# Patient Record
Sex: FEMALE | Race: ASIAN | NOT HISPANIC OR LATINO
[De-identification: names, ages, dates, MRNs, and addresses within clinical notes are randomized per-mention and may not be internally consistent; named-entity substitution may affect disease eponyms.]

---

## 2018-09-11 ENCOUNTER — APPOINTMENT (OUTPATIENT)
Dept: UROLOGY | Facility: CLINIC | Age: 48
End: 2018-09-11
Payer: MEDICAID

## 2018-09-11 VITALS
HEIGHT: 61 IN | OXYGEN SATURATION: 100 % | WEIGHT: 139 LBS | HEART RATE: 57 BPM | SYSTOLIC BLOOD PRESSURE: 157 MMHG | RESPIRATION RATE: 17 BRPM | TEMPERATURE: 98.8 F | DIASTOLIC BLOOD PRESSURE: 79 MMHG | BODY MASS INDEX: 26.24 KG/M2

## 2018-09-11 PROCEDURE — 52000 CYSTOURETHROSCOPY: CPT

## 2018-09-11 PROCEDURE — 99204 OFFICE O/P NEW MOD 45 MIN: CPT | Mod: 25

## 2018-09-11 RX ORDER — CIPROFLOXACIN HYDROCHLORIDE 500 MG/1
500 TABLET, FILM COATED ORAL
Qty: 2 | Refills: 0 | Status: ACTIVE | COMMUNITY
Start: 2018-09-11

## 2018-09-11 RX ORDER — CIPROFLOXACIN HYDROCHLORIDE 500 MG/1
500 TABLET, FILM COATED ORAL
Refills: 0 | Status: COMPLETED | OUTPATIENT
Start: 2018-09-11

## 2018-09-11 RX ADMIN — CIPROFLOXACIN HYDROCHLORIDE 0 MG: 500 TABLET, FILM COATED ORAL at 00:00

## 2018-09-18 LAB
APPEARANCE: CLEAR
BACTERIA: NEGATIVE
BILIRUBIN URINE: NEGATIVE
BLOOD URINE: ABNORMAL
COLOR: YELLOW
CORE LAB FLUID CYTOLOGY: NORMAL
GLUCOSE QUALITATIVE U: NEGATIVE MG/DL
HYALINE CASTS: 0 /LPF
KETONES URINE: NEGATIVE
LEUKOCYTE ESTERASE URINE: ABNORMAL
MICROSCOPIC-UA: NORMAL
NITRITE URINE: NEGATIVE
PH URINE: 6.5
PROTEIN URINE: NEGATIVE MG/DL
RED BLOOD CELLS URINE: 2 /HPF
SPECIFIC GRAVITY URINE: 1.01
SQUAMOUS EPITHELIAL CELLS: 1 /HPF
UROBILINOGEN URINE: NEGATIVE MG/DL
WHITE BLOOD CELLS URINE: 1 /HPF

## 2019-03-11 ENCOUNTER — APPOINTMENT (OUTPATIENT)
Dept: UROLOGY | Facility: CLINIC | Age: 49
End: 2019-03-11

## 2019-05-24 ENCOUNTER — APPOINTMENT (OUTPATIENT)
Dept: UROLOGY | Facility: CLINIC | Age: 49
End: 2019-05-24

## 2021-06-24 ENCOUNTER — APPOINTMENT (OUTPATIENT)
Dept: NEUROLOGY | Facility: CLINIC | Age: 51
End: 2021-06-24
Payer: COMMERCIAL

## 2021-06-24 PROCEDURE — 95886 MUSC TEST DONE W/N TEST COMP: CPT

## 2021-06-24 PROCEDURE — 95910 NRV CNDJ TEST 7-8 STUDIES: CPT

## 2021-11-10 ENCOUNTER — INPATIENT (INPATIENT)
Facility: HOSPITAL | Age: 51
LOS: 1 days | Discharge: HOME | DRG: 552 | End: 2021-11-12
Attending: INTERNAL MEDICINE | Admitting: STUDENT IN AN ORGANIZED HEALTH CARE EDUCATION/TRAINING PROGRAM
Payer: COMMERCIAL

## 2021-11-10 VITALS
DIASTOLIC BLOOD PRESSURE: 179 MMHG | OXYGEN SATURATION: 96 % | RESPIRATION RATE: 16 BRPM | WEIGHT: 119.93 LBS | SYSTOLIC BLOOD PRESSURE: 179 MMHG | HEIGHT: 61 IN | TEMPERATURE: 98 F | HEART RATE: 82 BPM

## 2021-11-10 LAB
ALBUMIN SERPL ELPH-MCNC: 5 G/DL — SIGNIFICANT CHANGE UP (ref 3.3–5)
ALP SERPL-CCNC: 82 U/L — SIGNIFICANT CHANGE UP (ref 40–120)
ALT FLD-CCNC: 19 U/L — SIGNIFICANT CHANGE UP (ref 10–45)
ANION GAP SERPL CALC-SCNC: 16 MMOL/L — SIGNIFICANT CHANGE UP (ref 5–17)
APPEARANCE UR: CLEAR — SIGNIFICANT CHANGE UP
APTT BLD: 31.3 SEC — SIGNIFICANT CHANGE UP (ref 27.5–35.5)
AST SERPL-CCNC: 18 U/L — SIGNIFICANT CHANGE UP (ref 10–40)
BACTERIA # UR AUTO: NEGATIVE — SIGNIFICANT CHANGE UP
BASOPHILS # BLD AUTO: 0.04 K/UL — SIGNIFICANT CHANGE UP (ref 0–0.2)
BASOPHILS NFR BLD AUTO: 0.8 % — SIGNIFICANT CHANGE UP (ref 0–2)
BILIRUB SERPL-MCNC: 0.7 MG/DL — SIGNIFICANT CHANGE UP (ref 0.2–1.2)
BILIRUB UR-MCNC: NEGATIVE — SIGNIFICANT CHANGE UP
BLD GP AB SCN SERPL QL: NEGATIVE — SIGNIFICANT CHANGE UP
BUN SERPL-MCNC: 17 MG/DL — SIGNIFICANT CHANGE UP (ref 7–23)
CALCIUM SERPL-MCNC: 10.1 MG/DL — SIGNIFICANT CHANGE UP (ref 8.4–10.5)
CHLORIDE SERPL-SCNC: 104 MMOL/L — SIGNIFICANT CHANGE UP (ref 96–108)
CO2 SERPL-SCNC: 23 MMOL/L — SIGNIFICANT CHANGE UP (ref 22–31)
COLOR SPEC: SIGNIFICANT CHANGE UP
CREAT SERPL-MCNC: 0.49 MG/DL — LOW (ref 0.5–1.3)
DIFF PNL FLD: ABNORMAL
EOSINOPHIL # BLD AUTO: 0.04 K/UL — SIGNIFICANT CHANGE UP (ref 0–0.5)
EOSINOPHIL NFR BLD AUTO: 0.8 % — SIGNIFICANT CHANGE UP (ref 0–6)
EPI CELLS # UR: 0 /HPF — SIGNIFICANT CHANGE UP
GLUCOSE SERPL-MCNC: 105 MG/DL — HIGH (ref 70–99)
GLUCOSE UR QL: NEGATIVE — SIGNIFICANT CHANGE UP
HCT VFR BLD CALC: 33.9 % — LOW (ref 34.5–45)
HGB BLD-MCNC: 12.3 G/DL — SIGNIFICANT CHANGE UP (ref 11.5–15.5)
HYALINE CASTS # UR AUTO: 0 /LPF — SIGNIFICANT CHANGE UP (ref 0–2)
IMM GRANULOCYTES NFR BLD AUTO: 0.4 % — SIGNIFICANT CHANGE UP (ref 0–1.5)
INR BLD: 1.04 RATIO — SIGNIFICANT CHANGE UP (ref 0.88–1.16)
KETONES UR-MCNC: NEGATIVE — SIGNIFICANT CHANGE UP
LEUKOCYTE ESTERASE UR-ACNC: NEGATIVE — SIGNIFICANT CHANGE UP
LYMPHOCYTES # BLD AUTO: 1.46 K/UL — SIGNIFICANT CHANGE UP (ref 1–3.3)
LYMPHOCYTES # BLD AUTO: 28.6 % — SIGNIFICANT CHANGE UP (ref 13–44)
MCHC RBC-ENTMCNC: 33 PG — SIGNIFICANT CHANGE UP (ref 27–34)
MCHC RBC-ENTMCNC: 36.3 GM/DL — HIGH (ref 32–36)
MCV RBC AUTO: 90.9 FL — SIGNIFICANT CHANGE UP (ref 80–100)
MONOCYTES # BLD AUTO: 0.34 K/UL — SIGNIFICANT CHANGE UP (ref 0–0.9)
MONOCYTES NFR BLD AUTO: 6.7 % — SIGNIFICANT CHANGE UP (ref 2–14)
NEUTROPHILS # BLD AUTO: 3.21 K/UL — SIGNIFICANT CHANGE UP (ref 1.8–7.4)
NEUTROPHILS NFR BLD AUTO: 62.7 % — SIGNIFICANT CHANGE UP (ref 43–77)
NITRITE UR-MCNC: NEGATIVE — SIGNIFICANT CHANGE UP
NRBC # BLD: 0 /100 WBCS — SIGNIFICANT CHANGE UP (ref 0–0)
PH UR: 7.5 — SIGNIFICANT CHANGE UP (ref 5–8)
PLATELET # BLD AUTO: 177 K/UL — SIGNIFICANT CHANGE UP (ref 150–400)
POTASSIUM SERPL-MCNC: 3.5 MMOL/L — SIGNIFICANT CHANGE UP (ref 3.5–5.3)
POTASSIUM SERPL-SCNC: 3.5 MMOL/L — SIGNIFICANT CHANGE UP (ref 3.5–5.3)
PROT SERPL-MCNC: 7.3 G/DL — SIGNIFICANT CHANGE UP (ref 6–8.3)
PROT UR-MCNC: ABNORMAL
PROTHROM AB SERPL-ACNC: 12.5 SEC — SIGNIFICANT CHANGE UP (ref 10.6–13.6)
RBC # BLD: 3.73 M/UL — LOW (ref 3.8–5.2)
RBC # FLD: 11.4 % — SIGNIFICANT CHANGE UP (ref 10.3–14.5)
RBC CASTS # UR COMP ASSIST: 17 /HPF — HIGH (ref 0–4)
RH IG SCN BLD-IMP: POSITIVE — SIGNIFICANT CHANGE UP
SARS-COV-2 RNA SPEC QL NAA+PROBE: SIGNIFICANT CHANGE UP
SODIUM SERPL-SCNC: 143 MMOL/L — SIGNIFICANT CHANGE UP (ref 135–145)
SP GR SPEC: 1.02 — SIGNIFICANT CHANGE UP (ref 1.01–1.02)
UROBILINOGEN FLD QL: NEGATIVE — SIGNIFICANT CHANGE UP
WBC # BLD: 5.11 K/UL — SIGNIFICANT CHANGE UP (ref 3.8–10.5)
WBC # FLD AUTO: 5.11 K/UL — SIGNIFICANT CHANGE UP (ref 3.8–10.5)
WBC UR QL: 1 /HPF — SIGNIFICANT CHANGE UP (ref 0–5)

## 2021-11-10 PROCEDURE — 72146 MRI CHEST SPINE W/O DYE: CPT | Mod: 26,MG

## 2021-11-10 PROCEDURE — 99285 EMERGENCY DEPT VISIT HI MDM: CPT

## 2021-11-10 PROCEDURE — 72148 MRI LUMBAR SPINE W/O DYE: CPT | Mod: 26,MG

## 2021-11-10 PROCEDURE — G1004: CPT

## 2021-11-10 RX ORDER — DEXAMETHASONE 0.5 MG/5ML
6 ELIXIR ORAL ONCE
Refills: 0 | Status: COMPLETED | OUTPATIENT
Start: 2021-11-10 | End: 2021-11-10

## 2021-11-10 RX ORDER — MORPHINE SULFATE 50 MG/1
2 CAPSULE, EXTENDED RELEASE ORAL ONCE
Refills: 0 | Status: DISCONTINUED | OUTPATIENT
Start: 2021-11-10 | End: 2021-11-10

## 2021-11-10 RX ORDER — ACETAMINOPHEN 500 MG
975 TABLET ORAL ONCE
Refills: 0 | Status: COMPLETED | OUTPATIENT
Start: 2021-11-10 | End: 2021-11-11

## 2021-11-10 RX ORDER — KETOROLAC TROMETHAMINE 30 MG/ML
15 SYRINGE (ML) INJECTION ONCE
Refills: 0 | Status: DISCONTINUED | OUTPATIENT
Start: 2021-11-10 | End: 2021-11-10

## 2021-11-10 RX ORDER — ACETAMINOPHEN 500 MG
1000 TABLET ORAL ONCE
Refills: 0 | Status: COMPLETED | OUTPATIENT
Start: 2021-11-10 | End: 2021-11-10

## 2021-11-10 RX ORDER — DIAZEPAM 5 MG
5 TABLET ORAL ONCE
Refills: 0 | Status: DISCONTINUED | OUTPATIENT
Start: 2021-11-10 | End: 2021-11-10

## 2021-11-10 RX ORDER — DEXAMETHASONE 0.5 MG/5ML
6 ELIXIR ORAL ONCE
Refills: 0 | Status: DISCONTINUED | OUTPATIENT
Start: 2021-11-10 | End: 2021-11-10

## 2021-11-10 RX ADMIN — Medication 15 MILLIGRAM(S): at 11:19

## 2021-11-10 RX ADMIN — Medication 15 MILLIGRAM(S): at 21:56

## 2021-11-10 RX ADMIN — Medication 5 MILLIGRAM(S): at 18:05

## 2021-11-10 RX ADMIN — Medication 6 MILLIGRAM(S): at 21:56

## 2021-11-10 RX ADMIN — MORPHINE SULFATE 2 MILLIGRAM(S): 50 CAPSULE, EXTENDED RELEASE ORAL at 23:02

## 2021-11-10 RX ADMIN — MORPHINE SULFATE 2 MILLIGRAM(S): 50 CAPSULE, EXTENDED RELEASE ORAL at 11:28

## 2021-11-10 RX ADMIN — Medication 400 MILLIGRAM(S): at 11:23

## 2021-11-10 NOTE — ED PROVIDER NOTE - OBJECTIVE STATEMENT
51 y F 2 wk L back pain rad to LLE, already seen by outpt spine MD who ordered an MRI.  BIBEMS b/c last night pain intensified and noted in the last 2 days having diminisehd sensation to L foot.  No motor weakness, saddle anesthesia, changes to bowel/bladder function/fc.  No trauma.  Put on unspecified pain medications by outpt spine.  Did not take pain Rx today.  Minimally able to ambulate.     PMH -- HTN, HLD  PSH -- c sections  NKDA  bisoprolol/hct, telmisartan, synthroid, vascepa, niacin

## 2021-11-10 NOTE — CONSULT NOTE ADULT - ATTENDING COMMENTS
51 year old female with lumbar radiculopathy in the setting of a lumbar disc herniation. She has no red flag symptoms, no bowel/bladder complaints, no saddle anesthesia. She should follow up within a week of discharge to ensure proper treatment. This was explained ot the patient as well and she was in agreement.

## 2021-11-10 NOTE — ED PROVIDER NOTE - PROGRESS NOTE DETAILS
S/W rads tech to expedite MRI L spine given concern for cauda equina.  Tech will work to have test performed ASAP.  Ortho consulted.  --BMM Attending Cassia: received sign out pending ortho recs and MR results. MR w/ L4-L5 disc bulge. No cauda. Cleared from ortho standpoint for outpatient f/u. Pt given multiple rounds of pain meds. Alexander give another round now and if still experiencing significant pain, will admit for pain control. Attending Cassia: pt still requiring pain medication, will admit for pain control

## 2021-11-10 NOTE — CONSULT NOTE ADULT - SUBJECTIVE AND OBJECTIVE BOX
Patient is a 51y Female who presents c/o of 2 weeks of LLE radiculopathy with acute worsening last night. Pt states that 2 weeks ago, while eating dinner, she suddenly felt pain traveling down both of her legs. The next day, her right leg pain had resolved but the left leg pain persisted. She went to her PCP this past Monday who had ordered an MRI but authorization was pending and she did not get it. Last night, she felt a severe increase in her pain with new onset L 1st toe numbness and subjective weakness. Denies pain/injury elsewhere. Denies numbness elsewhere/tingling/paresthesias/weakness. Denies bowel/bladder incontinence. Denies fevers/chills. No other complaints at this time.    HEALTH ISSUES - PROBLEM Dx:          MEDICATIONS  (STANDING):      Allergies    No Known Allergies    Intolerances        PAST MEDICAL & SURGICAL HISTORY:                            12.3   5.11  )-----------( 177      ( 10 Nov 2021 11:36 )             33.9                  Vital Signs Last 24 Hrs  T(C): 36.8 (11-10-21 @ 11:30), Max: 36.8 (11-10-21 @ 11:30)  T(F): 98.2 (11-10-21 @ 11:30), Max: 98.2 (11-10-21 @ 11:30)  HR: 56 (11-10-21 @ 11:30) (56 - 68)  BP: 159/80 (11-10-21 @ 11:30) (159/80 - 179/82)  BP(mean): --  RR: 18 (11-10-21 @ 11:30) (16 - 18)  SpO2: 98% (11-10-21 @ 11:30) (96% - 98%)    Physical Exam:  Gen: NAD  Spine:  Skin intact  No gross deformity  No midline TTP C/T/L/S spine  No bony step offs  No paraspinal muscle ttp/hypertonicity   Negative Straight leg raise  Negative clonus  Negative babinski  Negative norman  + rectal tone  No saddle anesthesia    Motor:                 L2             L3             L4               L5            S1  R         5/5           5/5          5/5             5/5           5/5  L          5/5          5/5           5/5             5/5           4+/5    Sensory:               L2          L3         L4      L5       S1         (0=absent, 1=impaired, 2=normal, NT=not testable)  R         2            2            2        2        2  L          2            2           2        2         2    Imaging:     A/P: 51y Female, consulted for rule out cauda equina syndrome. On exam, patient has some weakness with L plantar flexion which may be due to guarding. Rectal tone is intact without saddle anesthesia. No incontinence   Pain control  FU Labs/imaging  SCDs  Final plans pending imaging   Patient is a 51y Female who presents c/o of 2 weeks of LLE radiculopathy with acute worsening last night. Pt states that 2 weeks ago, while eating dinner, she suddenly felt pain traveling down both of her legs. The next day, her right leg pain had resolved but the left leg pain persisted. She went to her PCP this past Monday who had ordered an MRI but authorization was pending and she did not get it. Last night, she felt a severe increase in her pain with new onset L 1st toe numbness and subjective weakness. Denies pain/injury elsewhere. Denies numbness elsewhere/tingling/paresthesias/weakness. Denies bowel/bladder incontinence. Denies fevers/chills. No other complaints at this time.    HEALTH ISSUES - PROBLEM Dx:          MEDICATIONS  (STANDING):      Allergies    No Known Allergies    Intolerances        PAST MEDICAL & SURGICAL HISTORY:                            12.3   5.11  )-----------( 177      ( 10 Nov 2021 11:36 )             33.9                  Vital Signs Last 24 Hrs  T(C): 36.8 (11-10-21 @ 11:30), Max: 36.8 (11-10-21 @ 11:30)  T(F): 98.2 (11-10-21 @ 11:30), Max: 98.2 (11-10-21 @ 11:30)  HR: 56 (11-10-21 @ 11:30) (56 - 68)  BP: 159/80 (11-10-21 @ 11:30) (159/80 - 179/82)  BP(mean): --  RR: 18 (11-10-21 @ 11:30) (16 - 18)  SpO2: 98% (11-10-21 @ 11:30) (96% - 98%)    Physical Exam:  Gen: NAD  Spine:  Skin intact  No gross deformity  No midline TTP C/T/L/S spine  No bony step offs  No paraspinal muscle ttp/hypertonicity   Negative Straight leg raise  Negative clonus  Negative babinski  Negative norman  + rectal tone  No saddle anesthesia    Motor:                 L2             L3             L4               L5            S1  R         5/5           5/5          5/5             5/5           5/5  L          5/5          5/5           5/5             5/5           4+/5    Sensory:               L2          L3         L4      L5       S1         (0=absent, 1=impaired, 2=normal, NT=not testable)  R         2            2            2        2        2  L          2            2           2        2         2    Imaging:   < from: MR Thoracic Spine No Cont (11.10.21 @ 15:56) >  FINDINGS:    ALIGNMENT:  The alignment is normal.    VERTEBRAE: The vertebral bodies are normal in height. There is no fracture or aggressive osseous lesion.    DISCS: Multilevel disc desiccation and loss of height.    CORD: Thoracic cord is normal insize and signal. The conus medullaris terminates at T12-L1. Cauda equina is normal in appearance.    EVALUATION OF INDIVIDUAL LEVELS:  No large disc protrusion or spinal canal stenosis throughout the thoracic levels.    Most prominent lumbar levels as follows:  L3-L4: Disc bulge. No spinal canal or neuroforaminal stenosis.    L4-L5: Disc bulge and superimposed right central disc protrusion with slight superior migration. There is associated moderate to severe spinal canal stenosis. Mild bilateralneuroforaminal stenosis.    L5-S1: Disc bulge. No spinal canal or right neuroforaminal stenosis. Mild left neuroforaminal stenosis.    PARAVERTEBRAL SOFT TISSUES: The visualized paravertebral soft tissues appear within normal limits.    Other:  6 mm left hepatic T2 hyperintense lesion, limited in evaluation but statistically likely representing cyst.    IMPRESSION:    Multilevel degenerative changes, most prominent level as follows:    L4-L5 disc bulge and superimposed right central disc protrusion with slight superior migration. Resultant moderate to severe spinal canal stenosis and mild bilateral neuroforaminal stenosis.    Further details as above.    < end of copied text >      A/P: 51y Female, consulted for rule out cauda equina syndrome. On exam, patient has some weakness with L plantar flexion which may be due to guarding. Rectal tone is intact without saddle anesthesia. No incontinence. Symptoms consistent with L4-5 disc herniation with resulting central and foraminal stenosis    Pain control  SCDs  WBAT  - Recommend steroid taper:    --10mg decadron IV q6h x24h    --4mg decadron PO q4h x24h    --3mg decadron PO q4h x24h    --2mg decadron PO q4h x24h    --1mg decadron PO q4h x24h     --alternatively, may discharge with medrol dose pack    Patient may follow up in the office with Dr. Remy. Call [phone] for cordelia Cedeno PGY-2  Orthopaedic Surgery  [facility] [phone]  [phone]  [facility] [phone]

## 2021-11-10 NOTE — ED ADULT NURSE REASSESSMENT NOTE - NS ED NURSE REASSESS COMMENT FT1
Report taken from GO Baugh at 1100. Pt AAOx4, VS as documented. +pulses and +sensation of BL LE and UE. Pt has full ROM/strength in BL UE. Pt has limited ROM and weak in BL LE. PERRL intact. IV placed, labs drawn and sent. Pt medicated as per ED MD order for 10/10 lower back pain. Pt able to urinated and PVR bladder scan showed 50mL, ED MD Татьяна made aware. Spine MD at bedside evaluating patient. Bed locked in lowest position with appropriate side rails raised.

## 2021-11-10 NOTE — ED PROVIDER NOTE - PHYSICAL EXAMINATION
GENERAL: AAOx4, GCS 15, NAD, WDWN   HEENT: MMM, no jugular venous distension, supple neck, PERRLA, EOMI, nonicteric sclera  PULM: CTA B, no crackles/rubs/rales  CV: RRR, S1S2, no MRG  ABD: Flat abdomen, NTND, no R/G/R, no CVAT.    MSK: BULL, +2 pulses x4  NEURO: No obvious focal deficits  PSYCH: AAOx3, clear thought and normal sensorium

## 2021-11-10 NOTE — ED PROVIDER NOTE - CLINICAL SUMMARY MEDICAL DECISION MAKING FREE TEXT BOX
RLE pain/weakness/paresthesias, known (presumptive) sciatica x 2 wk.  Weakness/paresthesias stated acute since last night.  Pt is 3+/4- diffusely to LLE with diminished patella (1+) relative to 2+ on R.  +SLR.  No bowel/bladder changes or saddle anesthesia.  Rectal = .  Concern for cauda equina given new neuro deficits.  Will check labs, give IV pain Rx, plan to c/s NSG/spine and obtain emergent MRI L spine.  --BMM RLE pain/weakness/paresthesias, known (presumptive) sciatica x 2 wk.  Weakness/paresthesias stated acute since last night.  Pt is 3+/4- diffusely to LLE with diminished patella (1+) relative to 2+ on R.  +SLR.  No bowel/bladder changes or saddle anesthesia.  Rectal = somewhat reduced tone, poor squeeze.  Concern for cauda equina given new neuro deficits.  Will check labs, give IV pain Rx, plan to c/s NSG/spine and obtain emergent MRI L spine.  --BMM

## 2021-11-10 NOTE — ED ADULT NURSE NOTE - OBJECTIVE STATEMENT
51 y female brought by EMS reports to LLE pain beginning last night. worse when awaking this morning; reports to inability to ambulate due to pain; endorses pins/ needles. scheduled for MRI next week. hx of sciatica. Denies falls, LOC, head trauma, lightheadedness, dizziness, N/V/D, fevers, chills, cp, sob. Patient well- appearing. a&ox3. skin warm and dry. breathing comfortably in bed- no distress. abdomen soft nondistended. safety maintained- bed locked in lowest position. 51 y female brought by EMS reports to left lower back radiating down LLE pain beginning last night. worse when awaking this morning; reports to inability to ambulate due to pain; endorses pins/ needles. scheduled for MRI next week with "spine MD." hx of sciatica. Denies falls, LOC, head trauma, lightheadedness, dizziness, N/V/D, fevers, chills, cp, sob. reports to 10/10 pain. a&ox3. skin warm and dry. breathing comfortably in bed- no distress. abdomen soft nondistended. safety maintained- bed locked in lowest position.

## 2021-11-10 NOTE — CONSULT NOTE ADULT - TIME BILLING
Please note that over 70 minutes of time was spent in care of this patient, including pre-visit preparation, in person visit and post visit documentation, review of imaging and discussion with colleagues..

## 2021-11-11 DIAGNOSIS — Z98.891 HISTORY OF UTERINE SCAR FROM PREVIOUS SURGERY: Chronic | ICD-10-CM

## 2021-11-11 DIAGNOSIS — M51.26 OTHER INTERVERTEBRAL DISC DISPLACEMENT, LUMBAR REGION: ICD-10-CM

## 2021-11-11 DIAGNOSIS — E03.9 HYPOTHYROIDISM, UNSPECIFIED: ICD-10-CM

## 2021-11-11 DIAGNOSIS — M54.10 RADICULOPATHY, SITE UNSPECIFIED: ICD-10-CM

## 2021-11-11 DIAGNOSIS — I10 ESSENTIAL (PRIMARY) HYPERTENSION: ICD-10-CM

## 2021-11-11 DIAGNOSIS — E78.5 HYPERLIPIDEMIA, UNSPECIFIED: ICD-10-CM

## 2021-11-11 DIAGNOSIS — Z29.9 ENCOUNTER FOR PROPHYLACTIC MEASURES, UNSPECIFIED: ICD-10-CM

## 2021-11-11 LAB
ANION GAP SERPL CALC-SCNC: 16 MMOL/L — SIGNIFICANT CHANGE UP (ref 5–17)
BUN SERPL-MCNC: 18 MG/DL — SIGNIFICANT CHANGE UP (ref 7–23)
CALCIUM SERPL-MCNC: 10.2 MG/DL — SIGNIFICANT CHANGE UP (ref 8.4–10.5)
CHLORIDE SERPL-SCNC: 101 MMOL/L — SIGNIFICANT CHANGE UP (ref 96–108)
CO2 SERPL-SCNC: 21 MMOL/L — LOW (ref 22–31)
CREAT SERPL-MCNC: 0.68 MG/DL — SIGNIFICANT CHANGE UP (ref 0.5–1.3)
GLUCOSE SERPL-MCNC: 193 MG/DL — HIGH (ref 70–99)
HCT VFR BLD CALC: 36.5 % — SIGNIFICANT CHANGE UP (ref 34.5–45)
HGB BLD-MCNC: 13.3 G/DL — SIGNIFICANT CHANGE UP (ref 11.5–15.5)
MCHC RBC-ENTMCNC: 33.3 PG — SIGNIFICANT CHANGE UP (ref 27–34)
MCHC RBC-ENTMCNC: 36.4 GM/DL — HIGH (ref 32–36)
MCV RBC AUTO: 91.3 FL — SIGNIFICANT CHANGE UP (ref 80–100)
NRBC # BLD: 0 /100 WBCS — SIGNIFICANT CHANGE UP (ref 0–0)
PLATELET # BLD AUTO: 214 K/UL — SIGNIFICANT CHANGE UP (ref 150–400)
POTASSIUM SERPL-MCNC: 4.1 MMOL/L — SIGNIFICANT CHANGE UP (ref 3.5–5.3)
POTASSIUM SERPL-SCNC: 4.1 MMOL/L — SIGNIFICANT CHANGE UP (ref 3.5–5.3)
RBC # BLD: 4 M/UL — SIGNIFICANT CHANGE UP (ref 3.8–5.2)
RBC # FLD: 11.4 % — SIGNIFICANT CHANGE UP (ref 10.3–14.5)
SODIUM SERPL-SCNC: 138 MMOL/L — SIGNIFICANT CHANGE UP (ref 135–145)
WBC # BLD: 7.03 K/UL — SIGNIFICANT CHANGE UP (ref 3.8–10.5)
WBC # FLD AUTO: 7.03 K/UL — SIGNIFICANT CHANGE UP (ref 3.8–10.5)

## 2021-11-11 PROCEDURE — 99223 1ST HOSP IP/OBS HIGH 75: CPT

## 2021-11-11 RX ORDER — DEXAMETHASONE 0.5 MG/5ML
4 ELIXIR ORAL EVERY 4 HOURS
Refills: 0 | Status: DISCONTINUED | OUTPATIENT
Start: 2021-11-12 | End: 2021-11-12

## 2021-11-11 RX ORDER — HYDROCHLOROTHIAZIDE 25 MG
6.25 TABLET ORAL DAILY
Refills: 0 | Status: DISCONTINUED | OUTPATIENT
Start: 2021-11-11 | End: 2021-11-11

## 2021-11-11 RX ORDER — ACETAMINOPHEN 500 MG
650 TABLET ORAL EVERY 6 HOURS
Refills: 0 | Status: DISCONTINUED | OUTPATIENT
Start: 2021-11-11 | End: 2021-11-11

## 2021-11-11 RX ORDER — ICOSAPENT ETHYL 500 MG/1
1 CAPSULE, LIQUID FILLED ORAL
Qty: 0 | Refills: 0 | DISCHARGE

## 2021-11-11 RX ORDER — BISOPROLOL FUMARATE AND HYDROCHLOROTHIAZIDE 5; 6.25 MG/1; MG/1
1 TABLET ORAL
Qty: 0 | Refills: 0 | DISCHARGE

## 2021-11-11 RX ORDER — DEXAMETHASONE 0.5 MG/5ML
3 ELIXIR ORAL EVERY 4 HOURS
Refills: 0 | Status: DISCONTINUED | OUTPATIENT
Start: 2021-11-13 | End: 2021-11-12

## 2021-11-11 RX ORDER — METOPROLOL TARTRATE 50 MG
100 TABLET ORAL DAILY
Refills: 0 | Status: DISCONTINUED | OUTPATIENT
Start: 2021-11-11 | End: 2021-11-12

## 2021-11-11 RX ORDER — DEXAMETHASONE 0.5 MG/5ML
10 ELIXIR ORAL EVERY 6 HOURS
Refills: 0 | Status: COMPLETED | OUTPATIENT
Start: 2021-11-11 | End: 2021-11-12

## 2021-11-11 RX ORDER — LEVOTHYROXINE SODIUM 125 MCG
1 TABLET ORAL
Qty: 0 | Refills: 0 | DISCHARGE

## 2021-11-11 RX ORDER — MORPHINE SULFATE 50 MG/1
2 CAPSULE, EXTENDED RELEASE ORAL
Refills: 0 | Status: DISCONTINUED | OUTPATIENT
Start: 2021-11-11 | End: 2021-11-12

## 2021-11-11 RX ORDER — DEXAMETHASONE 0.5 MG/5ML
2 ELIXIR ORAL EVERY 4 HOURS
Refills: 0 | Status: CANCELLED | OUTPATIENT
Start: 2021-11-14 | End: 2021-11-12

## 2021-11-11 RX ORDER — DEXAMETHASONE 0.5 MG/5ML
1 ELIXIR ORAL EVERY 4 HOURS
Refills: 0 | Status: CANCELLED | OUTPATIENT
Start: 2021-11-15 | End: 2021-11-12

## 2021-11-11 RX ORDER — LOSARTAN POTASSIUM 100 MG/1
25 TABLET, FILM COATED ORAL DAILY
Refills: 0 | Status: DISCONTINUED | OUTPATIENT
Start: 2021-11-11 | End: 2021-11-12

## 2021-11-11 RX ORDER — KETOROLAC TROMETHAMINE 30 MG/ML
15 SYRINGE (ML) INJECTION
Refills: 0 | Status: DISCONTINUED | OUTPATIENT
Start: 2021-11-11 | End: 2021-11-11

## 2021-11-11 RX ORDER — NIACIN 50 MG
1 TABLET ORAL
Qty: 0 | Refills: 0 | DISCHARGE

## 2021-11-11 RX ORDER — OXYCODONE AND ACETAMINOPHEN 5; 325 MG/1; MG/1
2 TABLET ORAL EVERY 12 HOURS
Refills: 0 | Status: DISCONTINUED | OUTPATIENT
Start: 2021-11-11 | End: 2021-11-12

## 2021-11-11 RX ORDER — LEVOTHYROXINE SODIUM 125 MCG
88 TABLET ORAL DAILY
Refills: 0 | Status: DISCONTINUED | OUTPATIENT
Start: 2021-11-11 | End: 2021-11-12

## 2021-11-11 RX ORDER — HEPARIN SODIUM 5000 [USP'U]/ML
5000 INJECTION INTRAVENOUS; SUBCUTANEOUS EVERY 12 HOURS
Refills: 0 | Status: DISCONTINUED | OUTPATIENT
Start: 2021-11-11 | End: 2021-11-12

## 2021-11-11 RX ORDER — TELMISARTAN 20 MG/1
1 TABLET ORAL
Qty: 0 | Refills: 0 | DISCHARGE

## 2021-11-11 RX ADMIN — Medication 88 MICROGRAM(S): at 05:57

## 2021-11-11 RX ADMIN — Medication 100 MILLIGRAM(S): at 05:59

## 2021-11-11 RX ADMIN — MORPHINE SULFATE 2 MILLIGRAM(S): 50 CAPSULE, EXTENDED RELEASE ORAL at 06:13

## 2021-11-11 RX ADMIN — Medication 975 MILLIGRAM(S): at 05:57

## 2021-11-11 RX ADMIN — Medication 102 MILLIGRAM(S): at 13:50

## 2021-11-11 RX ADMIN — HEPARIN SODIUM 5000 UNIT(S): 5000 INJECTION INTRAVENOUS; SUBCUTANEOUS at 18:15

## 2021-11-11 RX ADMIN — OXYCODONE AND ACETAMINOPHEN 2 TABLET(S): 5; 325 TABLET ORAL at 20:50

## 2021-11-11 RX ADMIN — LOSARTAN POTASSIUM 25 MILLIGRAM(S): 100 TABLET, FILM COATED ORAL at 05:57

## 2021-11-11 RX ADMIN — Medication 102 MILLIGRAM(S): at 20:13

## 2021-11-11 NOTE — H&P ADULT - NSHPREVIEWOFSYSTEMS_GEN_ALL_CORE
REVIEW OF SYSTEMS:  CONSTITUTIONAL: No weakness. No fevers. No chills. No rigors. No weight loss. No night sweats. No poor appetite.  EYES: No blurry or double vision. No eye pain.  ENT: No hearing difficulty. No vertigo. No dysphagia. No sore throat. No Sinusitis/rhinorrhea.   NECK: No pain. No stiffness/rigidity.  CARDIAC: No chest pain. No palpitations. No lightheadedness. No syncope.  RESPIRATORY: No cough. No SOB. No hemoptysis.  GASTROINTESTINAL: No abdominal pain. No nausea. No vomiting. No hematemesis. No diarrhea. No constipation. No melena. No hematochezia.  GENITOURINARY: No dysuria. No frequency. No hesitancy. No hematuria. No oliguria.  NEUROLOGICAL: See HPI  BACK: No back pain. No flank pain.  EXTREMITIES: No lower extremity edema. Full ROM. No joint pain.  SKIN: No rashes. No itching. No other lesions.  PSYCHIATRIC: No depression. No anxiety. No SI/HI.  ALLERGIC: No lip swelling. No hives.  All other review of systems is negative unless indicated above.  Unless indicated above, unable to assess ROS 2/2

## 2021-11-11 NOTE — H&P ADULT - PROBLEM SELECTOR PLAN 2
stable at admission  -c/w telmisartan equiv losartan 50 mg qD, bisoprolol 5 mg equiv w/ metop 100 qD and HCTZ 6.25 mg qD

## 2021-11-11 NOTE — H&P ADULT - PROBLEM SELECTOR PLAN 1
LLE radicular pain x 2 weeks w/ acute worsening today; w/o signs of cauda equina, no bowel/bladder dysfunction; on physical exam, appears to have equal 5/5 b/l strength in extremities.  MR w/o cord compression or cauda equina, noted to have  L4, 5 disc bulge and superimposed right central disc protrusion with slight superior migration. Resultant moderate to severe spinal canal stenosis and mild bilateral neuroforaminal stenosis.  - s/p dexamethasone 6 mg in ED  - seen by neuro, f/up recs    - multimodal pain control- mild pain ketorolac 15 mg BID, moderate pain morphine 2 mg q4h prn LLE radicular pain x 2 weeks w/ acute worsening today; w/o signs of cauda equina, no bowel/bladder dysfunction; on physical exam, appears to have equal 5/5 b/l strength in extremities.  MR w/o cord compression or cauda equina, noted to have  L4, 5 disc bulge and superimposed right central disc protrusion with slight superior migration. Resultant moderate to severe spinal canal stenosis and mild bilateral neuroforaminal stenosis.  - s/p dexamethasone 6 mg in ED  - seen by neuro, f/up recs    -pain control- mild pain tylenol 650 mg prn, moderate pain ketorolac 15 mg BID prn, severe pain morphine 2 mg q4h prn LLE radicular pain x 2 weeks w/ acute worsening today; w/o signs of cauda equina, no bowel/bladder dysfunction; on physical exam, appears to have equal 5/5 b/l strength in extremities.  MR w/o cord compression or cauda equina, noted to have  L4, 5 disc bulge and superimposed right central disc protrusion with slight superior migration. Resultant moderate to severe spinal canal stenosis and mild bilateral neuroforaminal stenosis.  - s/p dexamethasone 6 mg in ED; discussed w/ ortho resident, recommend discharge w/ medrol dose pack once ready, can re-dose IV dexamethasone again if pain returns/uncontrolled, f/up w/ ortho outpt   - seen by ortho; f/up recs    -pain control- mild pain tylenol 650 mg prn, moderate pain ketorolac 15 mg BID prn, severe pain morphine 2 mg q4h prn

## 2021-11-11 NOTE — H&P ADULT - NSHPPHYSICALEXAM_GEN_ALL_CORE
PHYSICAL EXAM:   GENERAL: Alert. Not confused. No acute distress. Not thin. Not cachectic. Not obese.  HEAD:  Atraumatic. Normocephalic.  EYES: EOMI. PERRLA. Normal conjunctiva/sclera.  ENT: Neck supple. No JVD. Moist oral mucosa. Not edentulous. No thrush.  CARDIAC: No tachy, Not hien. Regular rhythm. Not irregularly irregular. S1. S2. No murmur. No rub. No distant heart sounds.  LUNG/CHEST: CTAB. BS equal bilaterally. No wheezes. No rales. No rhonchi.  ABDOMEN: Soft. No tenderness. No distension. No fluid wave. Normal bowel sounds.  BACK: No midline/vertebral tenderness. No flank tenderness.  VASCULAR: +2 b/l radial  pulses. Palpable DP pulses.  EXTREMITIES:  No clubbing. No cyanosis. No edema. Moving all 4.  NEUROLOGY: A&Ox3. Non-focal exam. Equal bilateral strength in upper and lower extremities at 5/5. Cranial nerves intact. Normal speech. Sensation intact and equal in all extremities.  PSYCH: Normal behavior. Normal affect.  SKIN: No jaundice. No erythema. No rash/lesion.  Vascular Access:     ICU Vital Signs Last 24 Hrs  T(C): 36.7 (11 Nov 2021 04:02), Max: 37.2 (10 Nov 2021 23:06)  T(F): 98.1 (11 Nov 2021 04:02), Max: 98.9 (10 Nov 2021 23:06)  HR: 66 (11 Nov 2021 04:02) (56 - 68)  BP: 146/81 (11 Nov 2021 04:02) (128/76 - 179/82)  BP(mean): --  ABP: --  ABP(mean): --  RR: 18 (11 Nov 2021 04:02) (16 - 20)  SpO2: 97% (11 Nov 2021 04:02) (96% - 99%)      I&O's Summary

## 2021-11-11 NOTE — H&P ADULT - ASSESSMENT
51 yr old Mandarin speaking female but declining  services w/ PMH of htn, hld presents w/ 2 weeks of LLE radiculopathy with acute worsening yesterday.

## 2021-11-11 NOTE — H&P ADULT - HISTORY OF PRESENT ILLNESS
51 yr old Mandarin speaking female but declining  services w/ PMH of htn, hld presents w/ 2 weeks of LLE radiculopathy with acute worsening yesterday. Pt states that 2 weeks ago, while eating dinner, she suddenly felt pain traveling down both of her legs. The next day, her right leg pain had resolved but the left leg pain persisted. Notes pain starts from her hips and mid back and radiates to her toes. She also notes that four days ago she started feeling numbness in her L big toe. She went to her PCP this past Monday who had ordered an MRI but authorization was pending and she did not get it. Last night, she felt a severe increase in her pain making her unable to walk. She denies pain or numbness elsewhere. Also denies paresthesias/weakness elsewhere. Denies bowel/bladder incontinence. Denies fevers/chills.

## 2021-11-12 ENCOUNTER — TRANSCRIPTION ENCOUNTER (OUTPATIENT)
Age: 51
End: 2021-11-12

## 2021-11-12 VITALS
RESPIRATION RATE: 18 BRPM | HEART RATE: 84 BPM | SYSTOLIC BLOOD PRESSURE: 129 MMHG | OXYGEN SATURATION: 97 % | TEMPERATURE: 98 F | DIASTOLIC BLOOD PRESSURE: 77 MMHG

## 2021-11-12 LAB
COVID-19 NUCLEOCAPSID GAM AB INTERP: NEGATIVE — SIGNIFICANT CHANGE UP
COVID-19 NUCLEOCAPSID TOTAL GAM ANTIBODY RESULT: 0.08 INDEX — SIGNIFICANT CHANGE UP
COVID-19 SPIKE DOMAIN AB INTERP: POSITIVE
COVID-19 SPIKE DOMAIN ANTIBODY RESULT: >250 U/ML — HIGH
SARS-COV-2 IGG+IGM SERPL QL IA: 0.08 INDEX — SIGNIFICANT CHANGE UP
SARS-COV-2 IGG+IGM SERPL QL IA: >250 U/ML — HIGH
SARS-COV-2 IGG+IGM SERPL QL IA: NEGATIVE — SIGNIFICANT CHANGE UP
SARS-COV-2 IGG+IGM SERPL QL IA: POSITIVE

## 2021-11-12 PROCEDURE — 85027 COMPLETE CBC AUTOMATED: CPT

## 2021-11-12 PROCEDURE — 85730 THROMBOPLASTIN TIME PARTIAL: CPT

## 2021-11-12 PROCEDURE — 99285 EMERGENCY DEPT VISIT HI MDM: CPT

## 2021-11-12 PROCEDURE — 96376 TX/PRO/DX INJ SAME DRUG ADON: CPT

## 2021-11-12 PROCEDURE — 85610 PROTHROMBIN TIME: CPT

## 2021-11-12 PROCEDURE — 80048 BASIC METABOLIC PNL TOTAL CA: CPT

## 2021-11-12 PROCEDURE — 81001 URINALYSIS AUTO W/SCOPE: CPT

## 2021-11-12 PROCEDURE — 72146 MRI CHEST SPINE W/O DYE: CPT | Mod: MG

## 2021-11-12 PROCEDURE — 86769 SARS-COV-2 COVID-19 ANTIBODY: CPT

## 2021-11-12 PROCEDURE — 36415 COLL VENOUS BLD VENIPUNCTURE: CPT

## 2021-11-12 PROCEDURE — 85025 COMPLETE CBC W/AUTO DIFF WBC: CPT

## 2021-11-12 PROCEDURE — 80053 COMPREHEN METABOLIC PANEL: CPT

## 2021-11-12 PROCEDURE — 86900 BLOOD TYPING SEROLOGIC ABO: CPT

## 2021-11-12 PROCEDURE — 86850 RBC ANTIBODY SCREEN: CPT

## 2021-11-12 PROCEDURE — 72148 MRI LUMBAR SPINE W/O DYE: CPT | Mod: MG

## 2021-11-12 PROCEDURE — 96374 THER/PROPH/DIAG INJ IV PUSH: CPT

## 2021-11-12 PROCEDURE — G1004: CPT

## 2021-11-12 PROCEDURE — 97161 PT EVAL LOW COMPLEX 20 MIN: CPT

## 2021-11-12 PROCEDURE — 86901 BLOOD TYPING SEROLOGIC RH(D): CPT

## 2021-11-12 PROCEDURE — 96375 TX/PRO/DX INJ NEW DRUG ADDON: CPT

## 2021-11-12 PROCEDURE — U0003: CPT

## 2021-11-12 RX ORDER — DEXAMETHASONE 0.5 MG/5ML
1 ELIXIR ORAL
Qty: 0 | Refills: 0 | DISCHARGE
Start: 2021-11-12

## 2021-11-12 RX ORDER — TRAMADOL HYDROCHLORIDE 50 MG/1
50 TABLET ORAL EVERY 8 HOURS
Refills: 0 | Status: DISCONTINUED | OUTPATIENT
Start: 2021-11-12 | End: 2021-11-12

## 2021-11-12 RX ORDER — DEXAMETHASONE 0.5 MG/5ML
1 ELIXIR ORAL
Qty: 1 | Refills: 0
Start: 2021-11-12

## 2021-11-12 RX ORDER — DEXAMETHASONE 0.5 MG/5ML
2 ELIXIR ORAL
Qty: 0 | Refills: 0 | DISCHARGE
Start: 2021-11-12

## 2021-11-12 RX ORDER — TRAMADOL HYDROCHLORIDE 50 MG/1
1 TABLET ORAL
Qty: 21 | Refills: 0
Start: 2021-11-12 | End: 2021-11-18

## 2021-11-12 RX ORDER — POLYETHYLENE GLYCOL 3350 17 G/17G
17 POWDER, FOR SOLUTION ORAL ONCE
Refills: 0 | Status: COMPLETED | OUTPATIENT
Start: 2021-11-12 | End: 2021-11-12

## 2021-11-12 RX ORDER — TRAMADOL HYDROCHLORIDE 50 MG/1
50 TABLET ORAL ONCE
Refills: 0 | Status: DISCONTINUED | OUTPATIENT
Start: 2021-11-12 | End: 2021-11-12

## 2021-11-12 RX ORDER — TRAMADOL HYDROCHLORIDE 50 MG/1
1 TABLET ORAL
Qty: 0 | Refills: 0 | DISCHARGE
Start: 2021-11-12

## 2021-11-12 RX ADMIN — Medication 100 MILLIGRAM(S): at 06:05

## 2021-11-12 RX ADMIN — TRAMADOL HYDROCHLORIDE 50 MILLIGRAM(S): 50 TABLET ORAL at 10:55

## 2021-11-12 RX ADMIN — Medication 88 MICROGRAM(S): at 06:05

## 2021-11-12 RX ADMIN — Medication 4 MILLIGRAM(S): at 10:55

## 2021-11-12 RX ADMIN — LOSARTAN POTASSIUM 25 MILLIGRAM(S): 100 TABLET, FILM COATED ORAL at 06:05

## 2021-11-12 RX ADMIN — MORPHINE SULFATE 2 MILLIGRAM(S): 50 CAPSULE, EXTENDED RELEASE ORAL at 08:30

## 2021-11-12 RX ADMIN — Medication 102 MILLIGRAM(S): at 01:06

## 2021-11-12 RX ADMIN — HEPARIN SODIUM 5000 UNIT(S): 5000 INJECTION INTRAVENOUS; SUBCUTANEOUS at 06:05

## 2021-11-12 RX ADMIN — Medication 4 MILLIGRAM(S): at 06:05

## 2021-11-12 RX ADMIN — MORPHINE SULFATE 2 MILLIGRAM(S): 50 CAPSULE, EXTENDED RELEASE ORAL at 07:34

## 2021-11-12 RX ADMIN — POLYETHYLENE GLYCOL 3350 17 GRAM(S): 17 POWDER, FOR SOLUTION ORAL at 13:38

## 2021-11-12 RX ADMIN — Medication 4 MILLIGRAM(S): at 03:22

## 2021-11-12 NOTE — PHYSICAL THERAPY INITIAL EVALUATION ADULT - DIAGNOSIS, PT EVAL
Pt presents with impairments in back pain radiating down L LE, L great toe numbness/tingling, mild strength impairments in L hip impacting ability to perform ADLs and functional mobility

## 2021-11-12 NOTE — DISCHARGE NOTE NURSING/CASE MANAGEMENT/SOCIAL WORK - NSPROEXTENSIONSOFSELF_GEN_A_NUR
Improved with Keppra XR 1500 mg qday  
JCV index 2.44  Three Tysabri infusions with improvement and returned to work    Plan to switch to Ocrevus in 3 months.      
walker

## 2021-11-12 NOTE — DISCHARGE NOTE PROVIDER - NSDCMRMEDTOKEN_GEN_ALL_CORE_FT
bisoprolol-hydrochlorothiazide 5 mg-6.25 mg oral tablet: 1 tab(s) orally once a day  dexamethasone 4 mg oral tablet: 1 tab(s) orally every 4 hours   --3mg decadron PO q4h x24h    --2mg decadron PO q4h x24h    --1mg decadron PO q4h x24h   niacin 750 mg oral tablet, extended release: 1 tab(s) orally once a day (at bedtime)  Outpt Physical THerapy :   Ralpher Walker :   Synthroid 88 mcg (0.088 mg) oral tablet: 1 tab(s) orally once a day  telmisartan 20 mg oral tablet: 1 tab(s) orally once a day  traMADol 50 mg oral tablet: 1 tab(s) orally every 8 hours, As needed, Moderate Pain (4 - 6) MDD:3  Vascepa 1 g oral capsule: 1 cap(s) orally once a day

## 2021-11-12 NOTE — PHYSICAL THERAPY INITIAL EVALUATION ADULT - LIVES WITH, PROFILE
Pt resides in private home with spouse and daughter, +1-2 small steps to enter and first floor setup. Pt reports being functionally independent in all aspects of functional mobility and self care up until x2 weeks ago when she started experiencing pain./children/spouse

## 2021-11-12 NOTE — PROGRESS NOTE ADULT - SUBJECTIVE AND OBJECTIVE BOX
afberile    REVIEW OF SYSTEMS:  GEN: no fever,    no chills  RESP: no SOB,   no cough  CVS: no chest pain,   no palpitations  GI: no abdominal pain,   no nausea,   no vomiting,   no constipation,   no diarrhea  : no dysuria,   no frequency  NEURO: no headache,   no dizziness  PSYCH: no depression,   not anxious  Derm : no rash    MEDICATIONS  (STANDING):  dexAMETHasone  IVPB 10 milliGRAM(s) IV Intermittent every 6 hours  heparin   Injectable 5000 Unit(s) SubCutaneous every 12 hours  levothyroxine 88 MICROGram(s) Oral daily  losartan 25 milliGRAM(s) Oral daily  metoprolol succinate  milliGRAM(s) Oral daily    MEDICATIONS  (PRN):  morphine  - Injectable 2 milliGRAM(s) IV Push four times a day PRN Severe Pain (7 - 10)  oxycodone    5 mG/acetaminophen 325 mG 2 Tablet(s) Oral every 12 hours PRN Moderate Pain (4 - 6)      Vital Signs Last 24 Hrs  T(C): 36.7 (2021 07:34), Max: 37.2 (10 Nov 2021 23:06)  T(F): 98 (2021 07:34), Max: 98.9 (10 Nov 2021 23:06)  HR: 62 (2021 07:34) (56 - 68)  BP: 132/72 (2021 07:34) (128/76 - 156/84)  BP(mean): --  RR: 17 (2021 07:34) (17 - 20)  SpO2: 98% (2021 07:34) (97% - 99%)  CAPILLARY BLOOD GLUCOSE        I&O's Summary      PHYSICAL EXAM:  HEAD:  Atraumatic, Normocephalic  NECK: Supple, No   JVD  CHEST/LUNG:   no     rales,     no,    rhonchi  HEART: Regular rate and rhythm;         murmur  ABDOMEN: Soft, Nontender, ;   EXTREMITIES:     no   edema  NEUROLOGY:  alert  no  leg weakn ess    LABS:                        12.3   5.11  )-----------( 177      ( 10 Nov 2021 11:36 )             33.9     11-10    143  |  104  |  17  ----------------------------<  105<H>  3.5   |  23  |  0.49<L>    Ca    10.1      10 Nov 2021 11:36    TPro  7.3  /  Alb  5.0  /  TBili  0.7  /  DBili  x   /  AST  18  /  ALT  19  /  AlkPhos  82  11-10    PT/INR - ( 10 Nov 2021 11:36 )   PT: 12.5 sec;   INR: 1.04 ratio         PTT - ( 10 Nov 2021 11:36 )  PTT:31.3 sec      Urinalysis Basic - ( 10 Nov 2021 11:20 )    Color: Light Yellow / Appearance: Clear / S.019 / pH: x  Gluc: x / Ketone: Negative  / Bili: Negative / Urobili: Negative   Blood: x / Protein: 30 mg/dL / Nitrite: Negative   Leuk Esterase: Negative / RBC: 17 /hpf / WBC 1 /HPF   Sq Epi: x / Non Sq Epi: 0 /hpf / Bacteria: Negative                      Consultant(s) Notes Reviewed:      Care Discussed with Consultants/Other Providers:    
  afebrile    REVIEW OF SYSTEMS:  GEN: no fever,    no chills  RESP: no SOB,   no cough  CVS: no chest pain,   no palpitations  GI: no abdominal pain,   no nausea,   no vomiting,   no constipation,   no diarrhea  : no dysuria,   no frequency  NEURO: no headache,   no dizziness  PSYCH: no depression,   not anxious  Derm : no rash    MEDICATIONS  (STANDING):  dexAMETHasone     Tablet 4 milliGRAM(s) Oral every 4 hours  dexAMETHasone  IVPB 10 milliGRAM(s) IV Intermittent every 6 hours  heparin   Injectable 5000 Unit(s) SubCutaneous every 12 hours  levothyroxine 88 MICROGram(s) Oral daily  losartan 25 milliGRAM(s) Oral daily  metoprolol succinate  milliGRAM(s) Oral daily    MEDICATIONS  (PRN):  morphine  - Injectable 2 milliGRAM(s) IV Push four times a day PRN Severe Pain (7 - 10)  oxycodone    5 mG/acetaminophen 325 mG 2 Tablet(s) Oral every 12 hours PRN Moderate Pain (4 - 6)      Vital Signs Last 24 Hrs  T(C): 36.8 (2021 05:16), Max: 36.9 (2021 23:52)  T(F): 98.2 (2021 05:16), Max: 98.5 (2021 23:52)  HR: 58 (2021 06:31) (57 - 73)  BP: 155/84 (2021 06:31) (140/78 - 164/82)  BP(mean): --  RR: 18 (2021 06:31) (18 - 18)  SpO2: 97% (2021 06:31) (97% - 98%)  CAPILLARY BLOOD GLUCOSE        I&O's Summary      PHYSICAL EXAM:  HEAD:  Atraumatic, Normocephalic  NECK: Supple, No   JVD  CHEST/LUNG:   no     rales,     no,    rhonchi  HEART: Regular rate and rhythm;         murmur  ABDOMEN: Soft, Nontender, ;   EXTREMITIES:    no    edema  NEUROLOGY:  alert  no  leg weakness    LABS:                        13.3   7.03  )-----------( 214      ( 2021 19:52 )             36.5         138  |  101  |  18  ----------------------------<  193<H>  4.1   |  21<L>  |  0.68    Ca    10.2      2021 19:52    TPro  7.3  /  Alb  5.0  /  TBili  0.7  /  DBili  x   /  AST  18  /  ALT  19  /  AlkPhos  82  11-10    PT/INR - ( 10 Nov 2021 11:36 )   PT: 12.5 sec;   INR: 1.04 ratio         PTT - ( 10 Nov 2021 11:36 )  PTT:31.3 sec      Urinalysis Basic - ( 10 Nov 2021 11:20 )    Color: Light Yellow / Appearance: Clear / S.019 / pH: x  Gluc: x / Ketone: Negative  / Bili: Negative / Urobili: Negative   Blood: x / Protein: 30 mg/dL / Nitrite: Negative   Leuk Esterase: Negative / RBC: 17 /hpf / WBC 1 /HPF   Sq Epi: x / Non Sq Epi: 0 /hpf / Bacteria: Negative                      Consultant(s) Notes Reviewed:      Care Discussed with Consultants/Other Providers:

## 2021-11-12 NOTE — PROGRESS NOTE ADULT - ASSESSMENT
51  yr  f,      PMH of htn, hld     presents w/ 2 weeks of LLE radiculopathy with   worsening       LLE radicular pain x 2 weeks   pt  has  5/5 b/l strength in extremities.  MR , L4, 5 disc bulge and superimposed right central disc protrusion with slight superior migration.   Resultant moderate to severe spinal canal stenosis and mild bilateral neuroforaminal stenosis.  -per  admitting team,  ortho resident, recommend discharge w/ medrol dose pack and, f/up w/ ortho outpt   - seen by ortho; on decadron taper  htn/  hld, on  telmisartan equiv losartan 50 mg qD, bisoprolol 5 mg equiv w/ metop 100 qD and HCTZ 6.25 mg qD  vascepa and niacin at home  anemia, gi  d r de leon called/  w/p a s  an out  pt  htn, on cozaar   plan. per ortho,  can be   d/c     dc. home todaym, on decadron taper    f/p  with ortho as  an out  pt  
 51  yr  f,      PMH of htn, hld     presents w/ 2 weeks of LLE radiculopathy with   worsening       LLE radicular pain x 2 weeks   pt  has  5/5 b/l strength in extremities.  MR , L4, 5 disc bulge and superimposed right central disc protrusion with slight superior migration.   Resultant moderate to severe spinal canal stenosis and mild bilateral neuroforaminal stenosis.  -per  admitting team,  ortho resident, recommend discharge w/ medrol dose pack and, f/up w/ ortho outpt   - seen by ortho; on decadron taper  htn/  hld, on  telmisartan equiv losartan 50 mg qD, bisoprolol 5 mg equiv w/ metop 100 qD and HCTZ 6.25 mg qD  vascepa and niacin at home  anemia, gi  d philipp de leon called/  w/p a s  an out  pt   plan. per ortho,  can be   d/c    d/c  home in am

## 2021-11-12 NOTE — PHYSICAL THERAPY INITIAL EVALUATION ADULT - PRECAUTIONS/LIMITATIONS, REHAB EVAL
She also notes that four days ago she started feeling numbness in her L big toe. She went to her PCP this past Monday who had ordered an MRI but authorization was pending and she did not get it. Last night, she felt a severe increase in her pain making her unable to walk. She denies pain or numbness elsewhere. Also denies paresthesias/weakness elsewhere. Denies bowel/bladder incontinence. Denies fevers/chills. Pt admitted to [address] [place] [address] for L4-5 lumbar radiculopathy, PT consulted for weakness.

## 2021-11-12 NOTE — DISCHARGE NOTE NURSING/CASE MANAGEMENT/SOCIAL WORK - PATIENT PORTAL LINK FT
You can access the FollowMyHealth Patient Portal offered by [facility] by registering at the following website: [email or web address]. By joining [facility]’s FollowMyHealth portal, you will also be able to view your health information using other applications (apps) compatible with our system.

## 2021-11-12 NOTE — CONSULT NOTE ADULT - ASSESSMENT
h/o anemia   radiculopathy    normal H/H here  no sings of bleeding   states she had colonoscopy ~15 years ago   recommend outpatient GI workup for anemia   dc planning   dw patient      Advanced care planning was discussed with patient and family.  Advanced care planning forms were reviewed and discussed.  Risks, benefits and alternatives of gastroenterologic procedures were discussed in detail and all questions were answered.    30 minutes spent.

## 2021-11-12 NOTE — PHYSICAL THERAPY INITIAL EVALUATION ADULT - PERTINENT HX OF CURRENT PROBLEM, REHAB EVAL
50 yo Mandarin speaking female w/ PMHx of htn, hld presents w/ 2 weeks of LLE radiculopathy with acute worsening yesterday. Pt states that 2 weeks ago, while eating dinner, she suddenly felt pain traveling down both of her legs. The next day, her right leg pain had resolved but the left leg pain persisted. Notes pain starts from her hips and mid back and radiates to her toes. CONTINUED:

## 2021-11-12 NOTE — DISCHARGE NOTE PROVIDER - NSDCCPCAREPLAN_GEN_ALL_CORE_FT
PRINCIPAL DISCHARGE DIAGNOSIS  Diagnosis: Lumbar disc herniation  Assessment and Plan of Treatment: -Outpt physical therapy as prescribed   -Continue decadron taper as prescribed     -4mg decadron PO q4h x24h    --3mg decadron PO q4h x24h    --2mg decadron PO q4h x24h    --1mg decadron PO q4h x24h   -Patient may follow up in the office with Dr. Remy. Call [phone] for appt  Follow-up with your PCP for further monitoring and treatment         SECONDARY DISCHARGE DIAGNOSES  Diagnosis: Hyperlipidemia  Assessment and Plan of Treatment: Follow up with PCP for treatment goals, continue medication, have liver function testing every 3 months as anti lipid medications can cause liver irritation, eat low fat, low cholesterol meals    Diagnosis: Hypertension  Assessment and Plan of Treatment: Low salt diet  Activity as tolerated.  Take all medication as prescribed.  Follow up with your medical doctor for routine blood pressure monitoring at your next visit.  Notify your doctor if you have any of the following symptoms:   Dizziness, Lightheadedness, Blurry vision, Headache, Chest pain, Shortness of breath

## 2021-11-12 NOTE — PHYSICAL THERAPY INITIAL EVALUATION ADULT - GENERAL OBSERVATIONS, REHAB EVAL
Pt received Pt received supine in bed in NAD, +IVL, VSS, agreeable to participate in therapy at this time

## 2021-11-12 NOTE — PHYSICAL THERAPY INITIAL EVALUATION ADULT - BALANCE TRAINING, PT EVAL
GOAL: Pt will improve balance by at least 1/2 grade to decrease fall risk during functional mobility within 3-4 wks.

## 2021-11-12 NOTE — PHYSICAL THERAPY INITIAL EVALUATION ADULT - PLANNED THERAPY INTERVENTIONS, PT EVAL
STAIR GOAL: Pt will negotiate 1 FOS independently with or without HR assist as needed within 3-4 wks./balance training/gait training

## 2021-11-12 NOTE — PHYSICAL THERAPY INITIAL EVALUATION ADULT - ADDITIONAL COMMENTS
MRI Spine 11/10/21 IMPRESSION: Multilevel degenerative changes, most prominent level as follows: L4-L5 disc bulge and superimposed right central disc protrusion with slight superior migration. Resultant moderate to severe spinal canal stenosis and mild bilateral neuroforaminal stenosis.

## 2021-11-12 NOTE — PHYSICAL THERAPY INITIAL EVALUATION ADULT - MANUAL MUSCLE TESTING RESULTS, REHAB EVAL
grossly assessed due to B UE/R LE: at least 4/5 throughout, L LE: 3+/5 throughout (limited due to pain)/grossly assessed due to

## 2021-11-12 NOTE — CONSULT NOTE ADULT - SUBJECTIVE AND OBJECTIVE BOX
[facility]  Beto Gaines PA-C  [facility] [place] [address]  [phone]      Chief Complaint:  Patient is a 51y old  Female who presents with a chief complaint of LLE radiculopathy (2021 08:19)      HPI: 51 yr old Mandarin speaking female but declining  services w/ PMH of htn, hld presents w/ 2 weeks of LLE radiculopathy with acute worsening yesterday. Pt states that 2 weeks ago, while eating dinner, she suddenly felt pain traveling down both of her legs. The next day, her right leg pain had resolved but the left leg pain persisted. Notes pain starts from her hips and mid back and radiates to her toes. She also notes that four days ago she started feeling numbness in her L big toe. She went to her PCP this past Monday who had ordered an MRI but authorization was pending and she did not get it. Last night, she felt a severe increase in her pain making her unable to walk. She denies pain or numbness elsewhere. Also denies paresthesias/weakness elsewhere. Denies bowel/bladder incontinence. Denies fevers/chills.     Allergies:  No Known Allergies      Medications:  dexAMETHasone     Tablet 4 milliGRAM(s) Oral every 4 hours  heparin   Injectable 5000 Unit(s) SubCutaneous every 12 hours  levothyroxine 88 MICROGram(s) Oral daily  losartan 25 milliGRAM(s) Oral daily  metoprolol succinate  milliGRAM(s) Oral daily  traMADol 50 milliGRAM(s) Oral every 8 hours PRN  traMADol 50 milliGRAM(s) Oral once      PMHX/PSHX:  Hypertension    Hyperlipidemia    H/O  section        Family history:  FH: hypertension        Social History:     ROS:     General:  No wt loss, fevers, chills, night sweats, fatigue,   Eyes:  Good vision, no reported pain  ENT:  No sore throat, pain, runny nose, dysphagia  CV:  No pain, palpitations, hypo/hypertension  Resp:  No dyspnea, cough, tachypnea, wheezing  GI:  No pain, No nausea, No vomiting, No diarrhea, No constipation, No weight loss, No fever, No pruritis, No rectal bleeding, No tarry stools, No dysphagia,  :  No pain, bleeding, incontinence, nocturia  Muscle: + pain, weakness  Neuro:  No weakness, tingling, memory problems  Psych:  No fatigue, insomnia, mood problems, depression  Endocrine:  No polyuria, polydipsia, cold/heat intolerance  Heme:  No petechiae, ecchymosis, easy bruisability  Skin:  No rash, tattoos, scars, edema      PHYSICAL EXAM:   Vital Signs:  Vital Signs Last 24 Hrs  T(C): 36.5 (2021 10:18), Max: 36.9 (2021 23:52)  T(F): 97.7 (2021 10:18), Max: 98.5 (2021 23:52)  HR: 84 (2021 10:18) (57 - 84)  BP: 129/77 (2021 10:18) (129/77 - 164/82)  BP(mean): --  RR: 18 (2021 10:18) (18 - 18)  SpO2: 97% (2021 10:18) (96% - 98%)  Daily     Daily     GENERAL:  Appears stated age,   HEENT:  NC/AT,    CHEST:  Full & symmetric excursion,   HEART:  Regular rhythm  ABDOMEN:  Soft, non-tender, non-distended,   EXTEREMITIES:  no cyanosis,clubbing or edema  SKIN:  No rash  NEURO:  Alert,    LABS:                        13.3   7.03  )-----------( 214      ( 2021 19:52 )             36.5         138  |  101  |  18  ----------------------------<  193<H>  4.1   |  21<L>  |  0.68    Ca    10.2      2021 19:52                Imaging:

## 2021-11-12 NOTE — DISCHARGE NOTE PROVIDER - HOSPITAL COURSE
52 y/o female PMH of htn, hld  presents w/ 2 weeks of LLE radiculopathy with   worsening   LLE radicular pain x 2 weeks pt  has  5/5 b/l strength in extremities.  MR , L4, 5 disc bulge and superimposed right central disc protrusion with slight superior migration. Resultant moderate to severe spinal canal stenosis and mild bilateral neuroforaminal stenosis.  -per  admitting team,  ortho-spine  resident, recommend discharge ; will continue decadron taper; f/up w/ ortho outpt .       Dr. Merchant has medically cleared patient for discharge home with follow-up as advised.

## 2021-11-12 NOTE — PHYSICAL THERAPY INITIAL EVALUATION ADULT - ORIENTATION, REHAB EVAL
From: Ana Reynolds  To: Lila Stewart DPM  Sent: 7/15/2021 9:36 AM CDT  Subject: Test Results Question    Hi Dr. Newt Fleischer,  I reach out to Dr Lora Marion and test results and the prescription Terinafine and his responce is below.     Per Dr. Lora Marion, he i A&Ox4/oriented to person, place, time and situation

## 2021-11-12 NOTE — DISCHARGE NOTE PROVIDER - CARE PROVIDER_API CALL
HERBERT BELTRAN  Neurological Surgery  [address] [place] [address]  Phone: [phone]  Fax: [phone])-  Follow Up Time:

## 2021-11-15 PROBLEM — E78.5 HYPERLIPIDEMIA, UNSPECIFIED: Chronic | Status: ACTIVE | Noted: 2021-11-11

## 2021-11-15 PROBLEM — I10 ESSENTIAL (PRIMARY) HYPERTENSION: Chronic | Status: ACTIVE | Noted: 2021-11-11

## 2021-11-19 ENCOUNTER — NON-APPOINTMENT (OUTPATIENT)
Age: 51
End: 2021-11-19

## 2021-11-19 ENCOUNTER — APPOINTMENT (OUTPATIENT)
Dept: ORTHOPEDIC SURGERY | Facility: CLINIC | Age: 51
End: 2021-11-19
Payer: COMMERCIAL

## 2021-11-19 VITALS
SYSTOLIC BLOOD PRESSURE: 117 MMHG | WEIGHT: 128 LBS | BODY MASS INDEX: 24.17 KG/M2 | DIASTOLIC BLOOD PRESSURE: 73 MMHG | HEIGHT: 61 IN | HEART RATE: 60 BPM

## 2021-11-19 PROCEDURE — 72110 X-RAY EXAM L-2 SPINE 4/>VWS: CPT

## 2021-11-19 PROCEDURE — 99214 OFFICE O/P EST MOD 30 MIN: CPT

## 2021-11-19 RX ORDER — TIZANIDINE 2 MG/1
2 TABLET ORAL
Qty: 30 | Refills: 0 | Status: ACTIVE | COMMUNITY
Start: 2021-11-19 | End: 1900-01-01

## 2021-11-19 RX ORDER — ACETAMINOPHEN 500 MG/1
500 TABLET, COATED ORAL
Qty: 50 | Refills: 0 | Status: ACTIVE | COMMUNITY
Start: 2021-11-19 | End: 1900-01-01

## 2021-11-19 RX ORDER — PREDNISONE 10 MG/1
10 TABLET ORAL
Qty: 39 | Refills: 0 | Status: ACTIVE | COMMUNITY
Start: 2021-11-19 | End: 1900-01-01

## 2021-11-19 NOTE — PHYSICAL EXAM
[de-identified] : Lumbar Physical Exam\par \par Gait - Normal\par \par Station - Normal\par \par Sagittal balance - Normal\par \par Compensatory mechanism? - None\par \par Heel walk - Normal\par \par Toe walk - Normal\par \par Reflexes\par Patellar - normal\par Gastroc - normal\par Clonus - No\par \par Hip Exam - Normal\par \par Straight leg raise - none\par \par Pulses - 2+ dp/pt\par \par Range of motion - normal\par \par Sensation \par Sensation intact to light touch in L1, L2, L3, L4, L5 and S1 dermatomes bilaterally, paresthesias over left big toe\par \par Motor\par 	IP	Quad	HS	TA	Gastroc	EHL\par Right	5/5	5/5	5/5	5/5	5/5	5/5\par Left	5/5	5/5	5/5	4/5	5/5	4/5 [de-identified] : Lumbar radiographs\par No instability on flexion-extension radiographs\par Disc degeneration noted at L4-L5\par \par L4-L5 disc herniation noted on MRI from the hospital\par L4-L5 with severe spinal stenosis

## 2021-11-19 NOTE — ASSESSMENT
[FreeTextEntry1] : I had a long discussion with the patient regards to her treatment plan and diagnosis.  We discussed various treatment options.  She has only had 3 weeks of symptoms and as such I would hold off on any sort of aggressive surgical intervention unless of course she developed bowel or bladder issues or saddle anesthesia.  I also discussed that if she is in intractable pain that could be an indication for surgery but if I was her I would try to hold off for at least another 3 weeks as there is a good chance that her symptoms will improve.  We will prescribe a epidural steroid injection.  I have given her a prednisone taper.  She can take tizanidine from pain.  She can also take Tylenol for pain.  I will have her follow-up in 2 weeks for repeat clinical evaluation.  I encouraged her to reach out to me sooner if she has any new or worsening symptoms.  We did go over red flag symptoms including but not limited to saddle anesthesia and bowel bladder incontinence. Please note that over 45 minutes of time was spent in care of this patient.

## 2021-11-19 NOTE — HISTORY OF PRESENT ILLNESS
[de-identified] : This is a 51-year-old female here today for evaluation of her severe left leg pain.  The symptoms have been ongoing for the past 3 weeks.  I did see the patient in the emergency room for the symptoms.  She did get an MRI which did show critical stenosis at L4-L5.  Thankfully she denies any bowel bladder issues.  She denies any saddle anesthesia.  She has had decreased walking tolerance and is walking with a walker.  Overall however she does state that her symptoms have improved since initially started 3 weeks ago.

## 2021-12-21 ENCOUNTER — APPOINTMENT (OUTPATIENT)
Dept: RADIOLOGY | Facility: CLINIC | Age: 51
End: 2021-12-21
Payer: COMMERCIAL

## 2021-12-21 ENCOUNTER — OUTPATIENT (OUTPATIENT)
Dept: OUTPATIENT SERVICES | Facility: HOSPITAL | Age: 51
LOS: 1 days | End: 2021-12-21
Payer: COMMERCIAL

## 2021-12-21 ENCOUNTER — RESULT REVIEW (OUTPATIENT)
Age: 51
End: 2021-12-21

## 2021-12-21 DIAGNOSIS — Z98.891 HISTORY OF UTERINE SCAR FROM PREVIOUS SURGERY: Chronic | ICD-10-CM

## 2021-12-21 DIAGNOSIS — M54.9 DORSALGIA, UNSPECIFIED: ICD-10-CM

## 2021-12-21 PROCEDURE — 62323 NJX INTERLAMINAR LMBR/SAC: CPT

## 2022-01-03 ENCOUNTER — APPOINTMENT (OUTPATIENT)
Dept: ORTHOPEDIC SURGERY | Facility: CLINIC | Age: 52
End: 2022-01-03
Payer: COMMERCIAL

## 2022-01-03 VITALS — HEIGHT: 61 IN | BODY MASS INDEX: 24.55 KG/M2 | WEIGHT: 130 LBS

## 2022-01-03 PROCEDURE — 99214 OFFICE O/P EST MOD 30 MIN: CPT

## 2022-01-03 NOTE — PHYSICAL EXAM
[de-identified] : Lumbar Physical Exam\par \par Gait - Normal\par \par Station - Normal\par \par Sagittal balance - Normal\par \par Compensatory mechanism? - None\par \par Heel walk - Normal\par \par Toe walk - Normal\par \par Reflexes\par Patellar - normal\par Gastroc - normal\par Clonus - No\par \par Hip Exam - Normal\par \par Straight leg raise - none\par \par Pulses - 2+ dp/pt\par \par Range of motion - normal\par \par Sensation \par Sensation intact to light touch in L1, L2, L3, L4, L5 and S1 dermatomes bilaterally, paresthesias over left big toe\par \par Motor\par 	IP	Quad	HS	TA	Gastroc	EHL\par Right	5/5	5/5	5/5	5/5	5/5	5/5\par Left	5/5	5/5	5/5	4/5	5/5	4/5 [de-identified] : Lumbar radiographs\par No instability on flexion-extension radiographs\par Disc degeneration noted at L4-L5\par \par L4-L5 disc herniation noted on MRI from the hospital\par L4-L5 with severe spinal stenosis

## 2022-01-03 NOTE — HISTORY OF PRESENT ILLNESS
[de-identified] : Today the patient still dealing with significant back and lower extremity symptoms.  She does have left posterior lateral thigh posterior lateral calf pain.  She did recently get an epidural steroid injection which did help her symptoms to some degree.  This was however a painful injection.  After 2 days however she did get symptom relief.  She denies any bowel bladder issues.  She denies any saddle anesthesia.\par \par 11/19/21\par This is a 51-year-old female here today for evaluation of her severe left leg pain.  The symptoms have been ongoing for the past 3 weeks.  I did see the patient in the emergency room for the symptoms.  She did get an MRI which did show critical stenosis at L4-L5.  Thankfully she denies any bowel bladder issues.  She denies any saddle anesthesia.  She has had decreased walking tolerance and is walking with a walker.  Overall however she does state that her symptoms have improved since initially started 3 weeks ago.

## 2022-01-03 NOTE — ASSESSMENT
[FreeTextEntry1] : I did discuss with the patient various treatment options including operative and nonoperative modalities of care.  She does have an MRI which does correspond to her clinical exam in terms of her location of pain.  She has had over 6 weeks of symptoms and has tried conservative measures to date.  Therefore I did offer her surgery.  At this point however she does feel like her symptoms have somewhat improved.  She would like to hold off on any aggressive operative intervention.  Given the fact that she has no red flag symptoms I think this is appropriate.  I will have her follow-up in 6 weeks.  We will begin physical therapy.  I did encourage her to reach out to me at any time if her symptoms worsen or change in any way.

## 2022-02-14 ENCOUNTER — APPOINTMENT (OUTPATIENT)
Dept: ORTHOPEDIC SURGERY | Facility: CLINIC | Age: 52
End: 2022-02-14
Payer: COMMERCIAL

## 2022-02-14 VITALS
WEIGHT: 130 LBS | HEIGHT: 61 IN | SYSTOLIC BLOOD PRESSURE: 143 MMHG | HEART RATE: 66 BPM | BODY MASS INDEX: 24.55 KG/M2 | DIASTOLIC BLOOD PRESSURE: 79 MMHG | OXYGEN SATURATION: 97 %

## 2022-02-14 PROCEDURE — 72110 X-RAY EXAM L-2 SPINE 4/>VWS: CPT

## 2022-02-14 PROCEDURE — 99214 OFFICE O/P EST MOD 30 MIN: CPT

## 2022-02-14 NOTE — HISTORY OF PRESENT ILLNESS
[de-identified] : Today the patient states that her symptoms have significantly improved since her last visit.  She has no severe radiating pain down her legs.  She does have occasionally some left posterior lateral calf pain.  The symptoms are currently controlled.  She denies any bowel bladder issues.  She denies any saddle anesthesia.\par \par 01/03/22\par Today the patient still dealing with significant back and lower extremity symptoms.  She does have left posterior lateral thigh posterior lateral calf pain.  She did recently get an epidural steroid injection which did help her symptoms to some degree.  This was however a painful injection.  After 2 days however she did get symptom relief.  She denies any bowel bladder issues.  She denies any saddle anesthesia.\par \par 11/19/21\par This is a 51-year-old female here today for evaluation of her severe left leg pain.  The symptoms have been ongoing for the past 3 weeks.  I did see the patient in the emergency room for the symptoms.  She did get an MRI which did show critical stenosis at L4-L5.  Thankfully she denies any bowel bladder issues.  She denies any saddle anesthesia.  She has had decreased walking tolerance and is walking with a walker.  Overall however she does state that her symptoms have improved since initially started 3 weeks ago.

## 2022-02-14 NOTE — PHYSICAL EXAM
[de-identified] : Lumbar Physical Exam\par \par Gait - Normal\par \par Station - Normal\par \par Sagittal balance - Normal\par \par Compensatory mechanism? - None\par \par Heel walk - Normal\par \par Toe walk - Normal\par \par Reflexes\par Patellar - normal\par Gastroc - normal\par Clonus - No\par \par Hip Exam - Normal\par \par Straight leg raise - none\par \par Pulses - 2+ dp/pt\par \par Range of motion - normal\par \par Sensation \par Sensation intact to light touch in L1, L2, L3, L4, L5 and S1 dermatomes bilaterally, paresthesias over left big toe\par \par Motor\par 	IP	Quad	HS	TA	Gastroc	EHL\par Right	5/5	5/5	5/5	5/5	5/5	5/5\par Left	5/5	5/5	5/5	4/5	5/5	4/5 [de-identified] : Lumbar radiographs\par No instability on flexion-extension radiographs\par Disc degeneration noted at L4-L5\par \par L4-L5 disc herniation noted on MRI from the hospital\par L4-L5 with severe spinal stenosis

## 2022-05-19 ENCOUNTER — APPOINTMENT (OUTPATIENT)
Dept: UROLOGY | Facility: CLINIC | Age: 52
End: 2022-05-19
Payer: COMMERCIAL

## 2022-05-19 VITALS — DIASTOLIC BLOOD PRESSURE: 83 MMHG | HEART RATE: 86 BPM | SYSTOLIC BLOOD PRESSURE: 177 MMHG

## 2022-05-19 DIAGNOSIS — E03.9 HYPOTHYROIDISM, UNSPECIFIED: ICD-10-CM

## 2022-05-19 DIAGNOSIS — N20.0 CALCULUS OF KIDNEY: ICD-10-CM

## 2022-05-19 DIAGNOSIS — M54.9 DORSALGIA, UNSPECIFIED: ICD-10-CM

## 2022-05-19 DIAGNOSIS — Z00.00 ENCOUNTER FOR GENERAL ADULT MEDICAL EXAMINATION W/OUT ABNORMAL FINDINGS: ICD-10-CM

## 2022-05-19 DIAGNOSIS — R31.29 OTHER MICROSCOPIC HEMATURIA: ICD-10-CM

## 2022-05-19 DIAGNOSIS — I10 ESSENTIAL (PRIMARY) HYPERTENSION: ICD-10-CM

## 2022-05-19 PROCEDURE — 99214 OFFICE O/P EST MOD 30 MIN: CPT

## 2022-05-19 PROCEDURE — 88112 CYTOPATH CELL ENHANCE TECH: CPT | Mod: 26

## 2022-05-21 LAB
APPEARANCE: CLEAR
BACTERIA: NEGATIVE
BILIRUBIN URINE: NEGATIVE
BLOOD URINE: NEGATIVE
COLOR: COLORLESS
GLUCOSE QUALITATIVE U: NEGATIVE
HYALINE CASTS: 0 /LPF
KETONES URINE: NEGATIVE
LEUKOCYTE ESTERASE URINE: NEGATIVE
MICROSCOPIC-UA: NORMAL
NITRITE URINE: NEGATIVE
PH URINE: 7
PROTEIN URINE: NEGATIVE
RED BLOOD CELLS URINE: 0 /HPF
SPECIFIC GRAVITY URINE: 1.01
SQUAMOUS EPITHELIAL CELLS: 0 /HPF
URINE CYTOLOGY: NORMAL
UROBILINOGEN URINE: NORMAL
WHITE BLOOD CELLS URINE: 0 /HPF

## 2022-05-24 NOTE — ADDENDUM
[FreeTextEntry1] : Entered by Sania Cerda, acting as scribe for Dr. Kishore Finley.\par \par The documentation recorded by the scribe accurately reflects the service I personally performed and the decisions made by me.

## 2022-05-24 NOTE — LETTER BODY
[FreeTextEntry1] : Rafael Armstrong MD\par [phone] [place] [address]\[place] [address]\par [phone] \par [phone]\par \par Dear Dr. Armstrong,\par \par Reason for visit: Abnormal imaging. Left kidney stone. Previous microscopic hematuria. \par \par This is a 52 year-old woman with previous microscopic hematuria presenting with abnormal abdominal imaging. She was last seen by me 4 years ago.  Her hematuria evaluation in 2018 was unremarkable. The patient recently underwent an abdominal ultrasound, which demonstrated a left kidney stone.  The patient denies any flank pain, hematuria, or urinary difficulties. She denies any aggravating or relieving factors. The patient denies any interference of function. She is entirely asymptomatic. All other review of systems are negative. Past medical history, family history and social history were inquired and were noncontributory to current condition. Medications and allergies were reviewed. She has no known allergies to medication. \par \par On examination, the patient is a healthy-appearing woman in no acute distress. She is alert and oriented follows commands. She  has normal mood and affect. She is normocephalic. Neck is supple. Oral no thrush. Respirations are unlabored. Abdomen is soft and nontender. Bladder is nonpalpable. No CVA tenderness. Neurologically she is grossly intact. No peripheral edema. Skin without gross abnormality.\par \par Ultrasound images demonstrated a left kidney stone. \par \par Assessment: Abnormal urinary imaging. Left kidney stone. Previous microscopic hematuria. \par \par I counseled the patient. In terms of her left kidney stone, I recommended she obtain a CT of abdomen and pelvis to further evaluate her stone burden.  I counseled the patient regarding the procedure. The risks and benefits were discussed. Alternatives were given. I answered the patient questions. The patient will take the necessary preparations for the procedure. Given her history of microscopic hematuria, I recommended she repeat urinalysis and urine cytology today. The patient will follow-up as directed and will contact me with any questions or concerns. Thank you for the opportunity to participate in the care of Ms. DALTON. I will keep you updated on her progress.\par \par Plan: CT abdomen and pelvis. Urinalysis. Urine cytology. Follow up as directed.

## 2022-05-25 ENCOUNTER — APPOINTMENT (OUTPATIENT)
Dept: CT IMAGING | Facility: IMAGING CENTER | Age: 52
End: 2022-05-25

## 2022-06-26 NOTE — DISCHARGE NOTE NURSING/CASE MANAGEMENT/SOCIAL WORK - DATE OF LAST VACCINATION
none reported has friends and a boyfriend, recent graduate of [employer] 10-Mar-2021 calm and cooperative  Vital Signs Last 24 Hrs  T(C): 37 (26 Jun 2022 12:20), Max: 37 (26 Jun 2022 12:20)  T(F): 98.6 (26 Jun 2022 12:20), Max: 98.6 (26 Jun 2022 12:20)  HR: 77 (26 Jun 2022 12:20) (66 - 77)  BP: 103/60 (26 Jun 2022 12:20) (97/59 - 103/60)  RR: 18 (26 Jun 2022 12:20) (18 - 18)  SpO2: 99% (26 Jun 2022 12:20) (99% - 100%)